# Patient Record
Sex: FEMALE | Race: WHITE
[De-identification: names, ages, dates, MRNs, and addresses within clinical notes are randomized per-mention and may not be internally consistent; named-entity substitution may affect disease eponyms.]

---

## 2023-02-23 ENCOUNTER — HOSPITAL ENCOUNTER (EMERGENCY)
Dept: HOSPITAL 4 - SED | Age: 23
Discharge: HOME | End: 2023-02-23
Payer: SELF-PAY

## 2023-02-23 VITALS — BODY MASS INDEX: 28.17 KG/M2 | WEIGHT: 165 LBS | HEIGHT: 64 IN

## 2023-02-23 VITALS — SYSTOLIC BLOOD PRESSURE: 121 MMHG

## 2023-02-23 VITALS — SYSTOLIC BLOOD PRESSURE: 126 MMHG
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
## 2023-02-23 DIAGNOSIS — Z79.899: ICD-10-CM
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
## 2023-02-23 DIAGNOSIS — S43.014A: Primary | ICD-10-CM
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
## 2023-02-23 PROCEDURE — 73020 X-RAY EXAM OF SHOULDER: CPT

## 2023-02-23 PROCEDURE — 99152 MOD SED SAME PHYS/QHP 5/>YRS: CPT

## 2023-02-23 PROCEDURE — 23650 CLTX SHO DSLC W/MNPJ WO ANES: CPT

## 2023-02-23 PROCEDURE — 99285 EMERGENCY DEPT VISIT HI MDM: CPT

## 2023-02-23 NOTE — NUR
pT ARRIVED WITH COMPLAINT OF RIGHT SHOULDER PAIN. PT ALERT AND ORIENTED X4, 
VSS, NO SIGNS OF ACUTE DISTRESS.  WILL PREP PT FOR MODERATE SEDATION PER MDS 
REQUEST.

## 2023-02-23 NOTE — NUR
Patient given written and verbal discharge instructions and verbalizes 
understanding.  ER MD discussed with patient the results and treatment 
provided. Patient in stable condition. ID arm band removed. IV catheter removed 
intact and dressing applied, no active bleeding.

Rx of NAPROXYN given. Patient educated on pain management and to follow up with 
PMD. Pain Scale .

Opportunity for questions provided and answered. Medication side effect fact 
sheet provided.